# Patient Record
Sex: FEMALE | Race: WHITE | NOT HISPANIC OR LATINO | URBAN - METROPOLITAN AREA
[De-identification: names, ages, dates, MRNs, and addresses within clinical notes are randomized per-mention and may not be internally consistent; named-entity substitution may affect disease eponyms.]

---
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
## 2024-09-09 NOTE — PROCEDURE: EDUCATIONAL RESOURCES PROVIDED
Medication: enalapril passed protocol.   Last office visit date: 9/5/2024  Next appointment scheduled?: Yes   Number of refills given: 1     Detail Level: Zone